# Patient Record
(demographics unavailable — no encounter records)

---

## 2024-10-16 NOTE — PHYSICAL EXAM
[Left] : left knee [Right] : right knee [4___] : quadriceps 4[unfilled]/5 [5___] : hamstring 5[unfilled]/5 [] : antalgic [de-identified] : extension 0 degrees [TWNoteComboBox7] : flexion 120 degrees

## 2024-10-16 NOTE — HISTORY OF PRESENT ILLNESS
[Work related] : work related [Gradual] : gradual [Dull/Aching] : dull/aching [Constant] : constant [Leisure] : leisure [Meds] : meds [Standing] : standing [Retired] : Work status: retired [8] : 8 [4] : 4 [Localized] : localized [Injection therapy] : injection therapy [de-identified] : WC 4/28/10 8/7/24: bilateral knee pain  8/28/24: bilateral knee pain 10/16/24: follow up bilateral knee pain, CSI sincere knees with good response.  Pain is 3/10.   [] : no [FreeTextEntry1] : Nicko knee [FreeTextEntry3] : 4/28/2010 [FreeTextEntry5] :  lifting a metal pipe and felt a "pop" in left knee, Left knee is worse than right moving can hear crackling and popping noise [FreeTextEntry6] : Clicking  [FreeTextEntry9] : CSI  [de-identified] : Getting up and down from seated position  [de-identified] : 1995 [de-identified] : Dr. Ramirez [de-identified] : 1995 left knee orthoscopic [de-identified] : MRI @ [facility]  [de-identified] : CSI - last done on 8/25/24

## 2024-10-16 NOTE — DISCUSSION/SUMMARY
[de-identified] : CSI with good response 8/28/24, too soon to repeat injections. Request auth for Orthovisc sincere knees x 4 Benefits of visco injections reviewed  The patient's orthopaedic condition(s) would warrant consideration of consistent or intermittent use of a prescription strength non-steroidal anti-inflammatory medication.  These medications are associated with risks including but not limited to gastrointestinal irritation, kidney damage, hypertension, and bleeding.    The patient has one or more medical conditions that would make this class of medication a significant risk and was therefore not prescribed.  kidney disease. hep and PT. folllow up 4 - 6 weeks. would like to try visco for both knees.  Will scope right knee if pain persists.

## 2024-10-16 NOTE — ASSESSMENT
[FreeTextEntry1] : comp case with multiple body parts.  date of parts is 4/28/10.  retired around that time.  longstanding bilateral L>R knee pain.   hx of L knee arthroscopy 1995. moderate oa present.  mri 2024 shows severe lateral compartment oa.  right knee pain with pf oa. less oa. large mmt.    Takes ibuprofen sparingly due to CKD. bothersome with prolonged standing/walking, stairs, and startup. No prior inj.   Retired construction  Professional Component, Technical Component Or Both?: professional and technical component

## 2024-10-16 NOTE — HISTORY OF PRESENT ILLNESS
[Work related] : work related [Gradual] : gradual [Dull/Aching] : dull/aching [Constant] : constant [Leisure] : leisure [Meds] : meds [Standing] : standing [Retired] : Work status: retired [8] : 8 [4] : 4 [Localized] : localized [Injection therapy] : injection therapy [de-identified] : WC 4/28/10 8/7/24: bilateral knee pain  8/28/24: bilateral knee pain 10/16/24: follow up bilateral knee pain, CSI sincere knees with good response.  Pain is 3/10.   [] : no [FreeTextEntry1] : Nicko knee [FreeTextEntry3] : 4/28/2010 [FreeTextEntry5] :  lifting a metal pipe and felt a "pop" in left knee, Left knee is worse than right moving can hear crackling and popping noise [FreeTextEntry6] : Clicking  [FreeTextEntry9] : CSI  [de-identified] : Getting up and down from seated position  [de-identified] : 1995 [de-identified] : Dr. Ramirez [de-identified] : 1995 left knee orthoscopic [de-identified] : CSI - last done on 8/25/24 [de-identified] : MRI @ [facility]  None

## 2024-10-16 NOTE — WORK
[Total (100%)] : total (100%) [Does not reveal pre-existing condition(s) that may affect treatment/prognosis] : does not reveal pre-existing condition(s) that may affect treatment/prognosis [Cannot return to work because ________] : cannot return to work because [unfilled] [Bending/Twisting] : bending/twisting [Climbing stairs/Ladders] : climbing stairs/ladders [Kneeling] : kneeling [Lifting] : lifting [Walking] : walking [Pulling/Pushing] : pulling/pushing [Sitting] : sitting [Patient] : patient [No Rx restrictions] : No Rx restrictions. [I provided the services listed above] :  I provided the services listed above. [FreeTextEntry1] : fair

## 2024-10-16 NOTE — DISCUSSION/SUMMARY
[de-identified] : CSI with good response 8/28/24, too soon to repeat injections. Request auth for Orthovisc sincere knees x 4 Benefits of visco injections reviewed  The patient's orthopaedic condition(s) would warrant consideration of consistent or intermittent use of a prescription strength non-steroidal anti-inflammatory medication.  These medications are associated with risks including but not limited to gastrointestinal irritation, kidney damage, hypertension, and bleeding.    The patient has one or more medical conditions that would make this class of medication a significant risk and was therefore not prescribed.  kidney disease. hep and PT. folllow up 4 - 6 weeks. would like to try visco for both knees.  Will scope right knee if pain persists.

## 2024-10-16 NOTE — ASSESSMENT
[FreeTextEntry1] : comp case with multiple body parts.  date of parts is 4/28/10.  retired around that time.  longstanding bilateral L>R knee pain.   hx of L knee arthroscopy 1995. moderate oa present.  mri 2024 shows severe lateral compartment oa.  right knee pain with pf oa. less oa. large mmt.    Takes ibuprofen sparingly due to CKD. bothersome with prolonged standing/walking, stairs, and startup. No prior inj.   Retired construction

## 2024-10-16 NOTE — DATA REVIEWED
[Right] : of the right [MRI] : MRI [Left] : left [Knee] : knee [I independently reviewed and interpreted images and report] : I independently reviewed and interpreted images and report [FreeTextEntry1] : complex tear mmt [FreeTextEntry2] : no tear.   alot of lateral compartment oa.

## 2024-11-06 NOTE — PROCEDURE
[Large Joint Injection] : Large joint injection [Bilateral] : bilaterally of the [Knee] : knee [Pain] : pain [Inflammation] : inflammation [X-ray evidence of Osteoarthritis on this or prior visit] : x-ray evidence of Osteoarthritis on this or prior visit [Alcohol] : alcohol [Betadine] : betadine [Ethyl Chloride sprayed topically] : ethyl chloride sprayed topically [Sterile technique used] : sterile technique used [Orthovisc (30mg)] : 30mg of Orthovisc [#1] : series #1 [] : Patient tolerated procedure well [Call if redness, pain or fever occur] : call if redness, pain or fever occur [Apply ice for 15min out of every hour for the next 12-24 hours as tolerated] : apply ice for 15 minutes out of every hour for the next 12-24 hours as tolerated [Prior failure or difficult injection] : prior failure or difficult injection

## 2024-11-06 NOTE — HISTORY OF PRESENT ILLNESS
[Work related] : work related [Gradual] : gradual [8] : 8 [4] : 4 [Dull/Aching] : dull/aching [Localized] : localized [Constant] : constant [Leisure] : leisure [Meds] : meds [Injection therapy] : injection therapy [Standing] : standing [Retired] : Work status: retired [de-identified] : WC 4/28/10 8/7/24: bilateral knee pain  8/28/24: bilateral knee pain 10/16/24: follow up bilateral knee pain, CSI sincere knees with good response.  Pain is 3/10.   11/6/24: f/u bilateral knee pain for Orthovisc #1. Stiffness and pain with ambulation.  He is doing PT. [] : no [FreeTextEntry1] : Nicko knee [FreeTextEntry3] : 4/28/2010 [FreeTextEntry5] : Here for MILTON knees Orthovisc #1 [FreeTextEntry6] : Clicking  [FreeTextEntry9] : CSI  [de-identified] : Getting up and down from seated position  [de-identified] : 1995 [de-identified] : Dr. Ramirez [de-identified] : 1995 left knee orthoscopic [de-identified] : MRI @ [facility]  [de-identified] : CSI - last done on 8/25/24

## 2024-11-06 NOTE — PHYSICAL EXAM
[Left] : left knee [Right] : right knee [4___] : quadriceps 4[unfilled]/5 [5___] : hamstring 5[unfilled]/5 [] : no erythema [de-identified] : extension 0 degrees [TWNoteComboBox7] : flexion 120 degrees

## 2024-11-06 NOTE — DISCUSSION/SUMMARY
[de-identified] : HEP and PT  Orthovisc as above post injection instructions CSI with good response 8/28/24  return one week to continue series  The patient's orthopaedic condition(s) would warrant consideration of consistent or intermittent use of a prescription strength non-steroidal anti-inflammatory medication.  These medications are associated with risks including but not limited to gastrointestinal irritation, kidney damage, hypertension, and bleeding.    The patient has one or more medical conditions that would make this class of medication a significant risk and was therefore not prescribed.  kidney disease.  Will scope right knee if pain persists.

## 2024-11-06 NOTE — PHYSICAL EXAM
[Left] : left knee [Right] : right knee [4___] : quadriceps 4[unfilled]/5 [5___] : hamstring 5[unfilled]/5 [] : no erythema [de-identified] : extension 0 degrees [TWNoteComboBox7] : flexion 120 degrees

## 2024-11-06 NOTE — HISTORY OF PRESENT ILLNESS
[Work related] : work related [Gradual] : gradual [8] : 8 [4] : 4 [Dull/Aching] : dull/aching [Localized] : localized [Constant] : constant [Leisure] : leisure [Meds] : meds [Injection therapy] : injection therapy [Standing] : standing [Retired] : Work status: retired [de-identified] : WC 4/28/10 8/7/24: bilateral knee pain  8/28/24: bilateral knee pain 10/16/24: follow up bilateral knee pain, CSI sincere knees with good response.  Pain is 3/10.   11/6/24: f/u bilateral knee pain for Orthovisc #1. Stiffness and pain with ambulation.  He is doing PT. [] : no [FreeTextEntry1] : Nicko knee [FreeTextEntry3] : 4/28/2010 [FreeTextEntry5] : Here for MILTON knees Orthovisc #1 [FreeTextEntry6] : Clicking  [FreeTextEntry9] : CSI  [de-identified] : Getting up and down from seated position  [de-identified] : 1995 [de-identified] : Dr. Ramirez [de-identified] : 1995 left knee orthoscopic [de-identified] : MRI @ [facility]  [de-identified] : CSI - last done on 8/25/24

## 2024-11-06 NOTE — DISCUSSION/SUMMARY
[de-identified] : HEP and PT  Orthovisc as above post injection instructions CSI with good response 8/28/24  return one week to continue series  The patient's orthopaedic condition(s) would warrant consideration of consistent or intermittent use of a prescription strength non-steroidal anti-inflammatory medication.  These medications are associated with risks including but not limited to gastrointestinal irritation, kidney damage, hypertension, and bleeding.    The patient has one or more medical conditions that would make this class of medication a significant risk and was therefore not prescribed.  kidney disease.  Will scope right knee if pain persists.

## 2024-11-13 NOTE — DISCUSSION/SUMMARY
[de-identified] : HEP and PT  Orthovisc as above post injection instructions  return one week to continue series  The patient's orthopaedic condition(s) would warrant consideration of consistent or intermittent use of a prescription strength non-steroidal anti-inflammatory medication.  These medications are associated with risks including but not limited to gastrointestinal irritation, kidney damage, hypertension, and bleeding.    The patient has one or more medical conditions that would make this class of medication a significant risk and was therefore not prescribed.  kidney disease.  Progress Note completed by Alejandra Woods PA-C The [facility] assigned on this date is under my supervision and saw this patient independently on this visit. I was in the office suite at the time.  I have periodically reviewed the patient chart as needed and I continue to oversee the medical decision making and care. -Dr. Greer

## 2024-11-13 NOTE — PHYSICAL EXAM
[Left] : left knee [Right] : right knee [4___] : quadriceps 4[unfilled]/5 [5___] : hamstring 5[unfilled]/5 [] : no erythema [de-identified] : extension 0 degrees [TWNoteComboBox7] : flexion 120 degrees

## 2024-11-13 NOTE — HISTORY OF PRESENT ILLNESS
[Work related] : work related [Gradual] : gradual [8] : 8 [4] : 4 [Dull/Aching] : dull/aching [Localized] : localized [Constant] : constant [Leisure] : leisure [Meds] : meds [Injection therapy] : injection therapy [Standing] : standing [Retired] : Work status: retired [de-identified] : WC 4/28/10 8/7/24: bilateral knee pain  8/28/24: bilateral knee pain 10/16/24: follow up bilateral knee pain, CSI sincere knees with good response.  Pain is 3/10.   11/6/24: f/u bilateral knee pain for Orthovisc #1. Stiffness and pain with ambulation.  He is doing PT. 11/13/24: Orthovisc #2 sincere knees  [] : no [FreeTextEntry1] : Nicko knee [FreeTextEntry3] : 4/28/2010 [FreeTextEntry5] : Here for MILTON knees Orthovisc #2 [FreeTextEntry6] : Clicking  [FreeTextEntry9] : CSI  [de-identified] : Getting up and down from seated position  [de-identified] : 1995 [de-identified] : Dr. Ramirez [de-identified] : 1995 left knee orthoscopic [de-identified] : MRI @ [facility]  [de-identified] : CSI - last done on 8/25/24

## 2024-11-13 NOTE — PROCEDURE
[Large Joint Injection] : Large joint injection [Bilateral] : bilaterally of the [Knee] : knee [Pain] : pain [Inflammation] : inflammation [X-ray evidence of Osteoarthritis on this or prior visit] : x-ray evidence of Osteoarthritis on this or prior visit [Alcohol] : alcohol [Betadine] : betadine [Ethyl Chloride sprayed topically] : ethyl chloride sprayed topically [Sterile technique used] : sterile technique used [Orthovisc (30mg)] : 30mg of Orthovisc [#2] : series #2 [] : Patient tolerated procedure well [Call if redness, pain or fever occur] : call if redness, pain or fever occur [Apply ice for 15min out of every hour for the next 12-24 hours as tolerated] : apply ice for 15 minutes out of every hour for the next 12-24 hours as tolerated [Prior failure or difficult injection] : prior failure or difficult injection

## 2024-11-20 NOTE — HISTORY OF PRESENT ILLNESS
[Work related] : work related [Gradual] : gradual [8] : 8 [4] : 4 [Dull/Aching] : dull/aching [Localized] : localized [Constant] : constant [Leisure] : leisure [Meds] : meds [Injection therapy] : injection therapy [Standing] : standing [Retired] : Work status: retired [3] : 3 [Orthovisc] : Orthovisc [de-identified] : WC 4/28/10 8/7/24: bilateral knee pain  8/28/24: bilateral knee pain 10/16/24: follow up bilateral knee pain, CSI sincere knees with good response.  Pain is 3/10.   11/6/24: f/u bilateral knee pain for Orthovisc #1. Stiffness and pain with ambulation.  He is doing PT. 11/13/24: Orthovisc #2 sincere knees  11/20/24: Orthovisc #3 sincere knees  [] : no [FreeTextEntry1] : Nicko knee [FreeTextEntry3] : 4/28/2010 [FreeTextEntry5] : Here for MILTON knees Orthovisc #3 [FreeTextEntry6] : Clicking  [FreeTextEntry9] : CSI  [de-identified] : Getting up and down from seated position  [de-identified] : 1995 [de-identified] : Dr. Ramirez [de-identified] : 1995 left knee orthoscopic [de-identified] : MRI @ [facility]  [de-identified] : CSI - last done on 8/25/24

## 2024-11-20 NOTE — DISCUSSION/SUMMARY
[de-identified] : HEP and PT  Orthovisc as above post injection instructions  return one week to continue series  The patient's orthopaedic condition(s) would warrant consideration of consistent or intermittent use of a prescription strength non-steroidal anti-inflammatory medication.  These medications are associated with risks including but not limited to gastrointestinal irritation, kidney damage, hypertension, and bleeding.    The patient has one or more medical conditions that would make this class of medication a significant risk and was therefore not prescribed.  kidney disease.  Progress Note completed by Alejandra Woods PA-C The [facility] assigned on this date is under my supervision and saw this patient independently on this visit. I was in the office suite at the time.  I have periodically reviewed the patient chart as needed and I continue to oversee the medical decision making and care. -Dr. Greer

## 2024-11-20 NOTE — PROCEDURE
[Large Joint Injection] : Large joint injection [Bilateral] : bilaterally of the [Knee] : knee [Pain] : pain [Inflammation] : inflammation [X-ray evidence of Osteoarthritis on this or prior visit] : x-ray evidence of Osteoarthritis on this or prior visit [Alcohol] : alcohol [Betadine] : betadine [Ethyl Chloride sprayed topically] : ethyl chloride sprayed topically [Sterile technique used] : sterile technique used [Orthovisc (30mg)] : 30mg of Orthovisc [#3] : series #3 [] : Patient tolerated procedure well [Call if redness, pain or fever occur] : call if redness, pain or fever occur [Apply ice for 15min out of every hour for the next 12-24 hours as tolerated] : apply ice for 15 minutes out of every hour for the next 12-24 hours as tolerated [Prior failure or difficult injection] : prior failure or difficult injection

## 2024-11-20 NOTE — PHYSICAL EXAM
[Left] : left knee [Right] : right knee [4___] : quadriceps 4[unfilled]/5 [5___] : hamstring 5[unfilled]/5 [] : no erythema [de-identified] : extension 0 degrees [TWNoteComboBox7] : flexion 120 degrees

## 2024-12-04 NOTE — HISTORY OF PRESENT ILLNESS
[Work related] : work related [Gradual] : gradual [8] : 8 [4] : 4 [Dull/Aching] : dull/aching [Localized] : localized [Constant] : constant [Leisure] : leisure [Meds] : meds [Injection therapy] : injection therapy [Standing] : standing [Retired] : Work status: retired [3] : 3 [Orthovisc] : Orthovisc [de-identified] : WC 4/28/10 8/7/24: bilateral knee pain  8/28/24: bilateral knee pain 10/16/24: follow up bilateral knee pain, CSI sincere knees with good response.  Pain is 3/10.   11/6/24: f/u bilateral knee pain for Orthovisc #1. Stiffness and pain with ambulation.  He is doing PT. 11/13/24: Orthovisc #2 sincere knees  11/20/24: Orthovisc #3 sincere knees  12/4/24: Orthovisc #4 sincere knees. R>L pain. [] : no [FreeTextEntry3] : 4/28/2010 [FreeTextEntry1] : Nicko knee [FreeTextEntry5] : Here for MILTON knees Orthovisc #4 [FreeTextEntry6] : Clicking  [FreeTextEntry9] : CSI  [de-identified] : Getting up and down from seated position  [de-identified] : 1995 [de-identified] : Dr. Ramirez [de-identified] : 1995 left knee orthoscopic [de-identified] : MRI @ [facility]  [de-identified] : CSI - last done on 8/25/24

## 2024-12-04 NOTE — PROCEDURE
[Large Joint Injection] : Large joint injection [Bilateral] : bilaterally of the [Knee] : knee [Pain] : pain [Inflammation] : inflammation [X-ray evidence of Osteoarthritis on this or prior visit] : x-ray evidence of Osteoarthritis on this or prior visit [Alcohol] : alcohol [Betadine] : betadine [Ethyl Chloride sprayed topically] : ethyl chloride sprayed topically [Sterile technique used] : sterile technique used [Orthovisc (30mg)] : 30mg of Orthovisc [] : Patient tolerated procedure well [Call if redness, pain or fever occur] : call if redness, pain or fever occur [Apply ice for 15min out of every hour for the next 12-24 hours as tolerated] : apply ice for 15 minutes out of every hour for the next 12-24 hours as tolerated [Prior failure or difficult injection] : prior failure or difficult injection [#4] : series #4

## 2024-12-04 NOTE — HISTORY OF PRESENT ILLNESS
[Work related] : work related [Gradual] : gradual [8] : 8 [4] : 4 [Dull/Aching] : dull/aching [Localized] : localized [Constant] : constant [Leisure] : leisure [Meds] : meds [Injection therapy] : injection therapy [Standing] : standing [Retired] : Work status: retired [3] : 3 [Orthovisc] : Orthovisc [de-identified] : WC 4/28/10 8/7/24: bilateral knee pain  8/28/24: bilateral knee pain 10/16/24: follow up bilateral knee pain, CSI sincere knees with good response.  Pain is 3/10.   11/6/24: f/u bilateral knee pain for Orthovisc #1. Stiffness and pain with ambulation.  He is doing PT. 11/13/24: Orthovisc #2 sincere knees  11/20/24: Orthovisc #3 sincere knees  12/4/24: Orthovisc #4 sincere knees. R>L pain. [] : no [FreeTextEntry1] : Nicko knee [FreeTextEntry3] : 4/28/2010 [FreeTextEntry5] : Here for MILTON knees Orthovisc #4 [FreeTextEntry6] : Clicking  [FreeTextEntry9] : CSI  [de-identified] : Getting up and down from seated position  [de-identified] : 1995 [de-identified] : Dr. Ramirez [de-identified] : 1995 left knee orthoscopic [de-identified] : MRI @ [facility]  [de-identified] : CSI - last done on 8/25/24

## 2024-12-04 NOTE — PHYSICAL EXAM
[Left] : left knee [Right] : right knee [4___] : quadriceps 4[unfilled]/5 [5___] : hamstring 5[unfilled]/5 [] : no erythema [de-identified] : extension 0 degrees [TWNoteComboBox7] : flexion 120 degrees

## 2024-12-04 NOTE — DISCUSSION/SUMMARY
[de-identified] : HEP  Orthovisc series completed today post injection instructions  return 6 weeks/prn  The patient's orthopaedic condition(s) would warrant consideration of consistent or intermittent use of a prescription strength non-steroidal anti-inflammatory medication.  These medications are associated with risks including but not limited to gastrointestinal irritation, kidney damage, hypertension, and bleeding.    The patient has one or more medical conditions that would make this class of medication a significant risk and was therefore not prescribed.  kidney disease.  Progress Note completed by Shannan Baird PA-C The [facility] assigned on this date is under my supervision and saw this patient independently on this visit. I was in the office suite at the time.  I have periodically reviewed the patient chart as needed and I continue to oversee the medical decision making and care. -Dr. Greer

## 2024-12-04 NOTE — PHYSICAL EXAM
[Left] : left knee [Right] : right knee [4___] : quadriceps 4[unfilled]/5 [5___] : hamstring 5[unfilled]/5 [] : no erythema [de-identified] : extension 0 degrees [TWNoteComboBox7] : flexion 120 degrees

## 2024-12-04 NOTE — DISCUSSION/SUMMARY
[de-identified] : HEP  Orthovisc series completed today post injection instructions  return 6 weeks/prn  The patient's orthopaedic condition(s) would warrant consideration of consistent or intermittent use of a prescription strength non-steroidal anti-inflammatory medication.  These medications are associated with risks including but not limited to gastrointestinal irritation, kidney damage, hypertension, and bleeding.    The patient has one or more medical conditions that would make this class of medication a significant risk and was therefore not prescribed.  kidney disease.  Progress Note completed by Shannan Baird PA-C The ZU[facility] assigned on this date is under my supervision and saw this patient independently on this visit. I was in the office suite at the time.  I have periodically reviewed the patient chart as needed and I continue to oversee the medical decision making and care. -Dr. Greer